# Patient Record
(demographics unavailable — no encounter records)

---

## 2024-10-07 NOTE — ASSESSMENT
[FreeTextEntry1] : 21-year-old female with no significant PMH here for f/u of abnormal TFTs   Hypothyroidism:  -On Levothyroxine 112 mcg daily  -Clinically euthyroid  Irregular Menses:   Optimal OCP with 20 mcg of estrogen to reduce insulin resistance and progesterone with least amount of androgenic activity. Previously DHEAS elevated. HgA1C consistent with prediabetes.   -Check prolactin, IGF-1 -Scheduled for CT of the head this week  -Will start Junel today  -Ultrasound of the ovaries wnl  -Extensive counseling on long term life style modifications such has decreased carbohydrates in diet, maintenance of good body weight and incorporation of exercise -Minimally elevated prolactin noted, which can be seen with PCOS, will recheck today and consider pituitary MRI if it continues to be high   History of thyroid nodule:  -Found to have an abnormal appearing hyperechoic nodule 1.8 cm in the background of a speckled appearance -It was biopsied during the appointment  -Will follow up on results -No family history of thyroid cancer    -Follow up in  4-5 months

## 2024-10-07 NOTE — PROCEDURE
[Fine Needle Aspiration] : Fine needle aspiration ~T ~C was performed. [Area of Mass: ______] : mass identified in the [unfilled] [Risks] : risks [Patient] : the patient [Benefits] : benefits [Ethyl Chloride] : ethyl chloride [Alcohol] : with alcohol [Supine] : The patient was placed in the supine position with the neck extended as tolerated. [3 Passes] : 3 passes were made through the mass [25 gauge 1.5 inch] : A 25 gauge 1.5 inch needle was used [Ultrasonic Guidance] : ultrasound guidance was employed [Sent to Histology] : The specimens were prepared in the usual manner and sent to histology. [Tolerated Well] : the patient tolerated the procedure well [Vital Signs Stable] : the vital signs were stable [[employer] e 2008 model, 10-12 MHz frequencies] : multiple real time longitudinal and transverse images were obtained using a high resolution ultrasound with a linear transducer, [employer] 2008 model, 10-12 MHz frequencies. All measurements will be reported as longitudinal x demar-posterior x transverse. [Thyroid Nodule] : thyroid nodule [] : speckled architecture [Right Thyroid] : right [Upper] : upper pole there is a  [Solid] : solid [Hyperechoic] : hyperechoic nodule [Ovoid] : ovoid in shape [Indistinct] : indistinct [Thin] : has a thin halo [Echogenic Foci] : echogenic foci [No vascularity] : no vascularity [1] : 1 [No abnormal lymph nodes are seen.] : no abnormal lymph nodes are seen [FreeTextEntry1] : 4.16 x 1.88 x 1.61 [FreeTextEntry5] : 2.79 x 1.02 x 1.67 [FreeTextEntry2] : 0.2 [FreeTextEntry3] : 1.88 x 1.29 x 1.67

## 2024-10-07 NOTE — REVIEW OF SYSTEMS
[Fatigue] : no fatigue [Decreased Appetite] : appetite not decreased [Recent Weight Gain (___ Lbs)] : no recent weight gain [Recent Weight Loss (___ Lbs)] : no recent weight loss [Visual Field Defect] : no visual field defect [Dysphagia] : no dysphagia [Dysphonia] : no dysphonia [Chest Pain] : no chest pain [Palpitations] : no palpitations [Shortness Of Breath] : no shortness of breath [Nausea] : no nausea [Vomiting] : no vomiting [Polyuria] : no polyuria [Irregular Menses] : regular menses [Joint Pain] : no joint pain [Muscle Weakness] : no muscle weakness [Acanthosis] : no acanthosis  [Acne] : no acne [Headaches] : no headaches [Dizziness] : no dizziness [Tremors] : no tremors [Pain/Numbness of Digits] : no pain/numbness of digits [Depression] : no depression [Polydipsia] : no polydipsia [Cold Intolerance] : no cold intolerance [Easy Bleeding] : no ~M tendency for easy bleeding [Easy Bruising] : no tendency for easy bruising

## 2024-10-07 NOTE — PROCEDURE
[Fine Needle Aspiration] : Fine needle aspiration ~T ~C was performed. [Area of Mass: ______] : mass identified in the [unfilled] [Risks] : risks [Patient] : the patient [Benefits] : benefits [Ethyl Chloride] : ethyl chloride [Alcohol] : with alcohol [Supine] : The patient was placed in the supine position with the neck extended as tolerated. [3 Passes] : 3 passes were made through the mass [25 gauge 1.5 inch] : A 25 gauge 1.5 inch needle was used [Ultrasonic Guidance] : ultrasound guidance was employed [Sent to Histology] : The specimens were prepared in the usual manner and sent to histology. [Tolerated Well] : the patient tolerated the procedure well [Vital Signs Stable] : the vital signs were stable [[employer] e 2008 model, 10-12 MHz frequencies] : multiple real time longitudinal and transverse images were obtained using a high resolution ultrasound with a linear transducer, [email or web address] e 2008 model, 10-12 MHz frequencies. All measurements will be reported as longitudinal x demar-posterior x transverse. [Thyroid Nodule] : thyroid nodule [] : speckled architecture [Right Thyroid] : right [Upper] : upper pole there is a  [Solid] : solid [Hyperechoic] : hyperechoic nodule [Ovoid] : ovoid in shape [Indistinct] : indistinct [Thin] : has a thin halo [Echogenic Foci] : echogenic foci [No vascularity] : no vascularity [1] : 1 [No abnormal lymph nodes are seen.] : no abnormal lymph nodes are seen [FreeTextEntry1] : 4.16 x 1.88 x 1.61 [FreeTextEntry5] : 2.79 x 1.02 x 1.67 [FreeTextEntry2] : 0.2 [FreeTextEntry3] : 1.88 x 1.29 x 1.67

## 2024-10-07 NOTE — IMPRESSION
[FreeTextEntry1] : Right sided 1.88 cm nodule displaying echogenic foci, with a speckled appearance that is notable in the entire thyroid [FreeTextEntry2] : Schedule for FNA of the right sided nodule

## 2024-10-07 NOTE — PROCEDURE
[Fine Needle Aspiration] : Fine needle aspiration ~T ~C was performed. [Area of Mass: ______] : mass identified in the [unfilled] [Risks] : risks [Patient] : the patient [Benefits] : benefits [Ethyl Chloride] : ethyl chloride [Supine] : The patient was placed in the supine position with the neck extended as tolerated. [Alcohol] : with alcohol [3 Passes] : 3 passes were made through the mass [25 gauge 1.5 inch] : A 25 gauge 1.5 inch needle was used [Ultrasonic Guidance] : ultrasound guidance was employed [Sent to Histology] : The specimens were prepared in the usual manner and sent to histology. [Tolerated Well] : the patient tolerated the procedure well [Vital Signs Stable] : the vital signs were stable [[employer] e 2008 model, 10-12 MHz frequencies] : multiple real time longitudinal and transverse images were obtained using a high resolution ultrasound with a linear transducer, [employer] e 2008 model, 10-12 MHz frequencies. All measurements will be reported as longitudinal x demar-posterior x transverse. [Thyroid Nodule] : thyroid nodule [] : speckled architecture [Right Thyroid] : right [Upper] : upper pole there is a  [Solid] : solid [Hyperechoic] : hyperechoic nodule [Ovoid] : ovoid in shape [Indistinct] : indistinct [Thin] : has a thin halo [Echogenic Foci] : echogenic foci [No vascularity] : no vascularity [1] : 1 [No abnormal lymph nodes are seen.] : no abnormal lymph nodes are seen [FreeTextEntry1] : 4.16 x 1.88 x 1.61 [FreeTextEntry5] : 2.79 x 1.02 x 1.67 [FreeTextEntry2] : 0.2 [FreeTextEntry3] : 1.88 x 1.29 x 1.67

## 2024-10-07 NOTE — PHYSICAL EXAM
[Alert] : alert [Well Nourished] : well nourished [No Acute Distress] : no acute distress [Normal Sclera/Conjunctiva] : normal sclera/conjunctiva [PERRL] : pupils equal, round and reactive to light [Normal Outer Ear/Nose] : the ears and nose were normal in appearance [Normal TMs] : both tympanic membranes were normal [No Neck Mass] : no neck mass was observed [Thyroid Not Enlarged] : the thyroid was not enlarged [No Respiratory Distress] : no respiratory distress [Clear to Auscultation] : lungs were clear to auscultation bilaterally [Normal S1, S2] : normal S1 and S2 [Normal Rate] : heart rate was normal [Regular Rhythm] : with a regular rhythm [Normal Bowel Sounds] : normal bowel sounds [Not Tender] : non-tender [Soft] : abdomen soft [Normal Gait] : normal gait [No Clubbing, Cyanosis] : no clubbing  or cyanosis of the fingernails [No Joint Swelling] : no joint swelling seen [No Rash] : no rash [No Skin Lesions] : no skin lesions [Oriented x3] : oriented to person, place, and time [Normal Affect] : the affect was normal [Normal Insight/Judgement] : insight and judgment were intact

## 2024-10-07 NOTE — HISTORY OF PRESENT ILLNESS
[FreeTextEntry1] : 21-year-old female here for f/u of hypothyroidism.   Patient reported that dx 1.5 years ago.  She has been on Levothyroxine 112 mcg daily  No FH of thyroid disease.   Symptoms:  No weight changes  No constipation  No hair loss  Periods are regular No fatigue  No brain fog  No cold intolerance  No dry skin   Thyroid US was normal  Previous Endocrinologist: Dr Leon

## 2024-11-07 NOTE — PROCEDURE
[de-identified] : Procedure performed: Fiberoptic Laryngeal Endoscopy - Diagnostic for thyroid nodule/vs cancer After informed verbal consent topical neosynephrine and lidocaine were applied, the fiberoptic nasal endoscope was passed via the R/L nasal cavity without incident. The patient tolerated the procedure quite well. The nasal cavity was normal without evidence of masses, polyps, lesions or drainage. The nasal septum and turbinates are within normal limits. Nasopharynx was normal without lesions or masses. Eustachian tube orifice normal bilaterals. Oropharynx and Hypopharyngeal mucosa are within normal limits without lesions masses, ulcerations or concerning findings. True and false vocal folds are within normal limits, normal sensory and motor examination. The base of tongue, vallecula, epiglottis are within normal limits without evidence of lesions or abnormalities.  Positive Findings: No vocal fold paresis noted on right

## 2024-11-07 NOTE — PHYSICAL EXAM
[Midline] : trachea located in midline position [de-identified] : Right thyroid nodule, no adenopathy [Normal] : no rashes

## 2024-11-07 NOTE — HISTORY OF PRESENT ILLNESS
[de-identified] : 11/5/24: 21F referred by Dr. Vallecillo for thyroid evaluation. Has a history of Hashimoto's, on synthroid 112 mcg daily. She was found to have a 1.8 cm abnormal appearing thyroid nodule, biopsied in office by Dr. Vallecillo. She has no family history of thyroid cancer. Denies any dysphagia, voice changes, difficulty swallowing, fever, chills, or unintentional weight loss.   10/7/24: FNA, right thyroid nodule Specimen(s) Submitted THYROID, RIGHT, FNA  Final Diagnosis THYROID GLAND, RIGHT, FNA:  POSITIVE FOR MALIGNANCY   (Category VI)

## 2024-11-07 NOTE — DATA REVIEWED
[de-identified] : US and FNA reviewed. No suspicious adenopathy noted. No significant lesions on left side.

## 2024-11-07 NOTE — HISTORY OF PRESENT ILLNESS
[de-identified] : 11/5/24: 21F referred by Dr. Vallecillo for thyroid evaluation. Has a history of Hashimoto's, on synthroid 112 mcg daily. She was found to have a 1.8 cm abnormal appearing thyroid nodule, biopsied in office by Dr. Vallecillo. She has no family history of thyroid cancer. Denies any dysphagia, voice changes, difficulty swallowing, fever, chills, or unintentional weight loss.   10/7/24: FNA, right thyroid nodule Specimen(s) Submitted THYROID, RIGHT, FNA  Final Diagnosis THYROID GLAND, RIGHT, FNA:  POSITIVE FOR MALIGNANCY   (Category VI)

## 2024-11-07 NOTE — PHYSICAL EXAM
[Midline] : trachea located in midline position [de-identified] : Right thyroid nodule, no adenopathy [Normal] : no rashes

## 2024-11-07 NOTE — PROCEDURE
[de-identified] : Procedure performed: Fiberoptic Laryngeal Endoscopy - Diagnostic for thyroid nodule/vs cancer After informed verbal consent topical neosynephrine and lidocaine were applied, the fiberoptic nasal endoscope was passed via the R/L nasal cavity without incident. The patient tolerated the procedure quite well. The nasal cavity was normal without evidence of masses, polyps, lesions or drainage. The nasal septum and turbinates are within normal limits. Nasopharynx was normal without lesions or masses. Eustachian tube orifice normal bilaterals. Oropharynx and Hypopharyngeal mucosa are within normal limits without lesions masses, ulcerations or concerning findings. True and false vocal folds are within normal limits, normal sensory and motor examination. The base of tongue, vallecula, epiglottis are within normal limits without evidence of lesions or abnormalities.  Positive Findings: No vocal fold paresis noted on right

## 2024-11-22 NOTE — HISTORY OF PRESENT ILLNESS
[de-identified] : Ms. Dorys Hensley is a 21-year-old female with PMHx of VWD about 5 years ago, recently diagnosed papillary thyroid carcinoma who came to the office for initial consultation for thyroid carcinoma.  She has long h/o excessive menstrual bleeding and epistaxis and was diagnosed with vwd 5 years age. She mentioned that she was previously given some pills and a nasal spray, which helped with the epistaxis, but she has never been on any treatment for VWD outside of acute bleeding episodes. She also mentioned experiencing excessive bleeding after wisdom tooth removal, which was controlled by applying pressure.  She also mentioned having problems with her thyroid function. She was diagnosed with hypothyroidism and has been requiring increasing doses of levothyroxine. Recently, she was found to have a lump in her thyroid gland. She underwent FNA which showed papillary thyroid carcinoma.  She is planned for Right thyroidectomy on 12/04/2024   Thyroid Ultrasound 10/7/24  Impression- Right sided 1.88 cm nodule displaying echogenic foci, with a speckled appearance that is notable in the entire thyroid   FNA cytology 10/7/2024 Right thyroid nodule  Final Diagnosis THYROID GLAND, RIGHT, FNA: POSITIVE FOR MALIGNANCY (Category VI) A second intradepartmental pathologist has Papillary thyroid carcinoma. reviewed this case in accordance with departmental policy.   PSHx wisdom tooth removal, no other surgeries. Family hx- No h/o any bleeding disorders in family. Her father has prostate cancer and sister has ovarian cancer. Social history, Denies smoking and illicit drug use. Drinks socially. [de-identified] : Papillary thyroid carcinoma. [de-identified] : The patient was seen and examined in the office today. She denied having any concerns.  No swallowing difficulty, dysphagia, voice changes. No B symptoms.

## 2024-11-22 NOTE — HISTORY OF PRESENT ILLNESS
[de-identified] : Ms. Dorys Hensley is a 21-year-old female with PMHx of VWD about 5 years ago, recently diagnosed papillary thyroid carcinoma who came to the office for initial consultation for thyroid carcinoma.  She has long h/o excessive menstrual bleeding and epistaxis and was diagnosed with vwd 5 years age. She mentioned that she was previously given some pills and a nasal spray, which helped with the epistaxis, but she has never been on any treatment for VWD outside of acute bleeding episodes. She also mentioned experiencing excessive bleeding after wisdom tooth removal, which was controlled by applying pressure.  She also mentioned having problems with her thyroid function. She was diagnosed with hypothyroidism and has been requiring increasing doses of levothyroxine. Recently, she was found to have a lump in her thyroid gland. She underwent FNA which showed papillary thyroid carcinoma.  She is planned for Right thyroidectomy on 12/04/2024   Thyroid Ultrasound 10/7/24  Impression- Right sided 1.88 cm nodule displaying echogenic foci, with a speckled appearance that is notable in the entire thyroid   FNA cytology 10/7/2024 Right thyroid nodule  Final Diagnosis THYROID GLAND, RIGHT, FNA: POSITIVE FOR MALIGNANCY (Category VI) A second intradepartmental pathologist has Papillary thyroid carcinoma. reviewed this case in accordance with departmental policy.   PSHx wisdom tooth removal, no other surgeries. Family hx- No h/o any bleeding disorders in family. Her father has prostate cancer and sister has ovarian cancer. Social history, Denies smoking and illicit drug use. Drinks socially. [de-identified] : Papillary thyroid carcinoma. [de-identified] : The patient was seen and examined in the office today. She denied having any concerns.  No swallowing difficulty, dysphagia, voice changes. No B symptoms.

## 2024-11-22 NOTE — ASSESSMENT
[FreeTextEntry1] : Ms. Dorys Hensley is a 21-year-old female with PMHx of VWD about 5 years ago, recently diagnosed papillary thyroid carcinoma who came to the office for initial consultation for thyroid carcinoma.  She has long h/o excessive menstrual bleeding and epistaxis and was diagnosed with vwd 5 years age. She mentioned that she was previously given some pills and a nasal spray, which helped with the epistaxis, but she has never been on any treatment for VWD outside of acute bleeding episodes. She also mentioned experiencing excessive bleeding after wisdom tooth removal, which was controlled by applying pressure.  She was also recently found to have a lump in her thyroid gland. She underwent FNA which showed papillary thyroid carcinoma.   # Von Willibrand disease  - Check VW antigen, activity, multimers and factor VIII levels.  - Check PT/INR, PTT levels.  #  Papillary thyroid carcinoma. - Planned for Right thyroidectomy on 12/04/2024  - Care per endocrinology, Dr. Vallecillo.   * She reported that her sister was diagnosed with ovarian cancer at the age of 32. Germline mutation testing would be beneficial for her.  Follow up visit on 11/26/24 to discuss results of VWD testing. Discussed with Dr. Knight

## 2024-11-22 NOTE — REVIEW OF SYSTEMS
[Proptosis] : no proptosis [Hot Flashes] : no hot flashes [Muscle Weakness] : no muscle weakness [Deepening Of The Voice] : no deepening of the voice

## 2024-12-23 NOTE — REASON FOR VISIT
[de-identified] : Patient and I visited via telehealth and she is doing well overall. Final pathology reveals Procedure:  Right lobectomy Tumor Tumor Focality:   Unifocal Tumor Characteristics Tumor Site:  Right lobe Tumor Size:  3.5 Centimeters (cm) Histologic Tumor Types and Subtypes:    Papillary carcinoma, diffuse sclerosing subtype Tumor Proliferative Activity Mitotic Rate:  Less than 3 mitoses per 2mm2 Tumor Necrosis:   Not identified Angioinvasion (vascular invasion):    Not identified Lymphatic Invasion:   Present Extrathyroidal Extension:   Present, clinical / macroscopic AND histologically confirmed Extrathyroidal Extension:   Invading only strap muscles (i.e., pT3b) All margins negative for carcinoma Margin Status: Regional Lymph Nodes Regional Lymph Node Status:   Tumor present in regional lymph node(s) Number of Lymph Nodes with Tumor:    3 Kira Level(s) Involved:   Level VI Size of Largest Metastatic Deposit:    0.4 cm Extranodal Extension (HAWK):   Not identified Number of Lymph Nodes Examined:   9 Kira Level(s) Examined:   Level VI pTNM Classification (AJCC 8th Edition) pT Category:   pT3b  We discussed she will likely require completion thyroidectomy and post surgical PAT given the extrathyroidal extension. Will discuss at endocrine tumor board and with her endocrinologist.   Adriel Powers DDS MD FACS Professor and Chair Department of Otolaryngology Head and Neck Surgery [facility]

## 2025-01-15 NOTE — ASSESSMENT
[FreeTextEntry1] : 21-year-old female with no significant PMH here for f/u of abnormal TFTs    Papillary thyroid cancer ( Diffuse sclerosing): -Found to have an abnormal appearing hyperechoic nodule 1.8 cm in the background of a speckled appearance -Now s/p right hemithyroidectomy and central neck dissection showing a 3.5 cm PTC, diffuse sclerosing variant  with 3 lymph nodes + ( 0.4 cm in largest dimension), with ETE and lymphatic invasion  -Will require completion thyroidectomy  -Will plan for NM-PAT soon after -Case will be discussed at Multidisciplinary tumor board  -Aggressive variant and will need close monitoring     Hypothyroidism:  -On Levothyroxine 112 mcg daily  -Clinically euthyroid -Check TFTs today   Hyperprolactinemia -Minimal elevation, will continue observation -CT head negative for pituitary adenoma :  -Ultrasound of the ovaries wnl     -Follow up in 3 months

## 2025-01-15 NOTE — PHYSICAL EXAM
[Alert] : alert [Well Nourished] : well nourished [No Acute Distress] : no acute distress [Normal Sclera/Conjunctiva] : normal sclera/conjunctiva [PERRL] : pupils equal, round and reactive to light [Normal Outer Ear/Nose] : the ears and nose were normal in appearance [Normal TMs] : both tympanic membranes were normal [No Neck Mass] : no neck mass was observed [Thyroid Not Enlarged] : the thyroid was not enlarged [No Respiratory Distress] : no respiratory distress [Clear to Auscultation] : lungs were clear to auscultation bilaterally [Normal S1, S2] : normal S1 and S2 [Normal Rate] : heart rate was normal [Regular Rhythm] : with a regular rhythm [Normal Bowel Sounds] : normal bowel sounds [Soft] : abdomen soft [Normal Gait] : normal gait [No Joint Swelling] : no joint swelling seen [No Rash] : no rash [No Skin Lesions] : no skin lesions [No Motor Deficits] : the motor exam was normal [Oriented x3] : oriented to person, place, and time [Normal Insight/Judgement] : insight and judgment were intact

## 2025-01-15 NOTE — HISTORY OF PRESENT ILLNESS
[FreeTextEntry1] : 21-year-old female here for f/u of thyroid cancer and hypothyroidism.   Thyroid Cancer ( PTC diffuse sclerosing subtype):  Thyroid nodule seen on the last visit in the background of speckled appearance of the entire thyroid, FNA was performed immediately.  Subsequent FNA was showing a 2.79 x 1.02 x 1.67 cm nodule that was positive for PTC.  Referred for surgery:  Right lobectomy with central neck dissection was performed on 12/03:   Accession:                             [id number]  Collected Date/Time:                   12/4/2024 09:14 EST Received Date/Time:                    12/4/2024 13:50 EST  Addendum Report - Auth (Verified)  Addendum The addendum is to attest that the pathologist of record reviewed and interpreted the case.  Verified by: Yaima Hough M.D. (Electronic Signature) Reported on: 01/08/25 13:55 [facility], [facility], [address] [place] [address] Phone: [phone]   Fax: [phone] _________________________________________________________________  Surgical Pathology Report - Auth (Verified) Specimen(s) Submitted 1  Right lobe 2  Right central compartment dissection   Final Diagnosis 1.  Thyroid, right lobe, lobectomy: - Papillary thyroid carcinoma, diffuse sclerosing subtype. - Tumor size: 3.5 cm. - Lymphatic invasion is identified. - Vascular invasion is not identified. - Focal extrathyroidal extension is identified. - The surgical resection margins are negative for carcinoma. - Marked chronic lymphocytic thyroiditis. - Two lymph nodes, negative for metastatic carcinoma (0/2). - Please see Note and Synoptic Report for additional information.  Note: Immunohistochemical stains (TTF-1 and AE1/AE3) support above diagnosis.  2.  Right central compartment dissection: -   Three out of 7 lymph nodes, positive for metastatic papillary thyroid carcinoma  (3/7). Verified by: Gloria Mendes M.D. (Electronic Signature)  Reported on: 12/17/24 17:34 EST, [facility] [address] [place], [place] [address] Phone: [phone]   Fax: [phone]      She has been on Levothyroxine 112 mcg daily  No FH of thyroid disease.   Symptoms:  No weight changes  No constipation  No hair loss  Periods are regular No fatigue  No brain fog  No cold intolerance  No dry skin   Thyroid US was normal  Previous Endocrinologist: Dr Knight

## 2025-01-15 NOTE — REVIEW OF SYSTEMS
[Fatigue] : no fatigue [Decreased Appetite] : appetite not decreased [Recent Weight Gain (___ Lbs)] : no recent weight gain [Recent Weight Loss (___ Lbs)] : no recent weight loss [Visual Field Defect] : no visual field defect [Dry Eyes] : no dryness [Neck Pain] : no neck pain [Nasal Congestion] : no nasal congestion [Chest Pain] : no chest pain [Palpitations] : no palpitations [Shortness Of Breath] : no shortness of breath [Nausea] : no nausea [Vomiting] : no vomiting [Polyuria] : no polyuria [Irregular Menses] : regular menses [Joint Pain] : no joint pain [Acanthosis] : no acanthosis  [Headaches] : no headaches [Tremors] : no tremors [Depression] : no depression [Polydipsia] : no polydipsia [Cold Intolerance] : no cold intolerance [Easy Bleeding] : no ~M tendency for easy bleeding [Easy Bruising] : no tendency for easy bruising

## 2025-02-21 NOTE — REASON FOR VISIT
[de-identified] : The patient and I visited via the telehealth video conferencing eleuterio today for her postoperative check after completion thyroidectomy for right [id number] papillary thyroid CA with lymphatic invasion. She is doing well. Incision is healing nicely. She is getting a cold but otherwise feels well. Swallowing is normal. She has had a left sided earache which has been intermittent. We are monitoring this as well. It is unclear if this was related to her recent surgery or other issue final pathology was reviewed. She will need radioactive dine and we have set her up to see Dr. Gutiérrez and consultation she does live in [place] and therefore may need some additional coordination. Surgical Pathology Report - Auth (Verified)  Specimen(s) Submitted 1. Left thyroid lobe 2. Left paratracheal nodes  Final Diagnosis  1. Left thyroid lobe, resection: - Thyroid tissue with severe lymphocytic thyroiditis and presence  of lymphatic spread of papillary thyroid carcinoma in the form of psammomatous calcifications in the lymphatic channels - One reactive lymph node seen  2. Left paratracheal nodes, excision: - Two reactive lymph nodes seen Verified by: Carloz Campos MD (Electronic Signature) Reported on: 02/20/25 16:43 [facility], [facility], [address] [place] [address] Phone: [phone]   Fax: [phone] _________________________________________________________________   Adriel Powers DDS MD FACS Professor and Chair Department of Otolaryngology Head and Neck Surgery [facility]